# Patient Record
Sex: FEMALE | Race: OTHER | HISPANIC OR LATINO
[De-identification: names, ages, dates, MRNs, and addresses within clinical notes are randomized per-mention and may not be internally consistent; named-entity substitution may affect disease eponyms.]

---

## 2022-11-18 PROBLEM — Z00.00 ENCOUNTER FOR PREVENTIVE HEALTH EXAMINATION: Status: ACTIVE | Noted: 2022-11-18

## 2022-11-28 ENCOUNTER — APPOINTMENT (OUTPATIENT)
Dept: NEUROLOGY | Facility: CLINIC | Age: 57
End: 2022-11-28
Payer: COMMERCIAL

## 2022-11-28 VITALS
WEIGHT: 155 LBS | HEART RATE: 84 BPM | SYSTOLIC BLOOD PRESSURE: 126 MMHG | HEIGHT: 57 IN | BODY MASS INDEX: 33.44 KG/M2 | DIASTOLIC BLOOD PRESSURE: 76 MMHG

## 2022-11-28 DIAGNOSIS — Z78.9 OTHER SPECIFIED HEALTH STATUS: ICD-10-CM

## 2022-11-28 DIAGNOSIS — Z82.0 FAMILY HISTORY OF EPILEPSY AND OTHER DISEASES OF THE NERVOUS SYSTEM: ICD-10-CM

## 2022-11-28 DIAGNOSIS — Z86.39 PERSONAL HISTORY OF OTHER ENDOCRINE, NUTRITIONAL AND METABOLIC DISEASE: ICD-10-CM

## 2022-11-28 PROCEDURE — 99204 OFFICE O/P NEW MOD 45 MIN: CPT

## 2022-11-28 RX ORDER — ATORVASTATIN CALCIUM 80 MG/1
TABLET, FILM COATED ORAL
Refills: 0 | Status: ACTIVE | COMMUNITY

## 2022-11-28 RX ORDER — CHROMIUM 200 MCG
TABLET ORAL
Refills: 0 | Status: ACTIVE | COMMUNITY

## 2022-11-28 NOTE — ASSESSMENT
[FreeTextEntry1] : Impression: This 57-year-old Congolese-speaking female has a chronic history of headache for many years as described above most consistent with migraine and her presentation at this time for the last several months is consistent with chronic daily headache.  There is the daily use of Excedrin.  Today's neurological exam is normal.  There is a family history of migraine in her son and daughter.\par \par Recommendations: MRI of the brain with and without contrast.  Begin naproxen 500 mg twice daily for 5 days and then as needed.  Begin nortriptyline 10 mg 1 to 2 tablets at bedtime as directed.  Consider other medication trials for chronic daily headache and chronic migraine.  The patient recently had a physical exam and blood tests and those records will be requested.  Office follow-up.\par

## 2022-11-28 NOTE — HISTORY OF PRESENT ILLNESS
[FreeTextEntry1] : This patient is seen for an office consultation.  She is seen with her employer.  The patient speaks Panamanian and her employer translates.  The patient is a 57-year-old female who has chronic headache for years.  The headache is worsened during the last 3 months.  The headache can occur unilaterally and each temple shifting from side to side and is described as being severe and poorly characterized.  The patient will have nausea and vomiting and dizziness in association.  The headache is most severe in the morning after awakening and she will take Excedrin each morning with partial relief.  She was given a prescription medication for headache 4 months ago which she does not recall the name.  She denies visual aura.  She denies head trauma syncope seizure or CVA.  She denies any focal neurological complaints.\par \par Past history significant for hyperlipidemia and vitamin D deficiency treated with atorvastatin and vitamin D.\par \par Family history positive for headache and a son and daughter.\par \par The patient did emig[place] to [place] country from [place].  She speaks only Panamanian.  She is a non-smoker and does not use alcohol

## 2022-11-28 NOTE — PHYSICAL EXAM
[FreeTextEntry1] : This patient speaks Upper sorbian and the exam was performed with her friend and employer interpreting. \par \par  Head:  Normocephalic Neck: Supple nontender no carotid bruits.\par \par Mental Status:  Alert Oriented X3 Speech normal and no aphasia or dysarthria.  Speaks only Upper sorbian.   is present\par \par Cranial Nerves:  PERRL, Fundi normal Visual Fields full  EOMI no diplopia no ptosis no nystagmus, V through XII intact.\par \par Motor:  No drift, normal strength tone and coordination and no focal atrophy. No abnormal movements. No dysmetria.  Normal rapid alternating movements. \par \par DTRs: Symmetric and 2+.  Plantars flexor.  No Clonus.\par \par Sensory:  Normal testing with pin light touch and vibration and  Joint position sense.  Normal DSS to touch.\par \par Gait:  Normal including tandem walking heel toe walking and Rhomberg.\par

## 2022-11-29 RX ORDER — NAPROXEN 500 MG/1
500 TABLET ORAL
Qty: 60 | Refills: 1 | Status: ACTIVE | COMMUNITY
Start: 2022-11-28 | End: 1900-01-01

## 2022-11-29 RX ORDER — NORTRIPTYLINE HYDROCHLORIDE 10 MG/1
10 CAPSULE ORAL
Qty: 60 | Refills: 5 | Status: ACTIVE | COMMUNITY
Start: 2022-11-28 | End: 1900-01-01

## 2023-01-03 ENCOUNTER — APPOINTMENT (OUTPATIENT)
Dept: MRI IMAGING | Facility: HOSPITAL | Age: 58
End: 2023-01-03
Payer: MEDICAID

## 2023-01-03 ENCOUNTER — OUTPATIENT (OUTPATIENT)
Dept: OUTPATIENT SERVICES | Facility: HOSPITAL | Age: 58
LOS: 1 days | End: 2023-01-03
Payer: COMMERCIAL

## 2023-01-03 DIAGNOSIS — R51.9 HEADACHE, UNSPECIFIED: ICD-10-CM

## 2023-01-03 PROCEDURE — 70553 MRI BRAIN STEM W/O & W/DYE: CPT

## 2023-01-03 PROCEDURE — A9579: CPT

## 2023-01-03 PROCEDURE — 70553 MRI BRAIN STEM W/O & W/DYE: CPT | Mod: 26

## 2023-01-23 ENCOUNTER — APPOINTMENT (OUTPATIENT)
Dept: NEUROLOGY | Facility: CLINIC | Age: 58
End: 2023-01-23
Payer: MEDICAID

## 2023-01-23 VITALS
DIASTOLIC BLOOD PRESSURE: 76 MMHG | HEIGHT: 57 IN | SYSTOLIC BLOOD PRESSURE: 132 MMHG | HEART RATE: 86 BPM | WEIGHT: 155 LBS | BODY MASS INDEX: 33.44 KG/M2

## 2023-01-23 VITALS
SYSTOLIC BLOOD PRESSURE: 132 MMHG | BODY MASS INDEX: 33.44 KG/M2 | DIASTOLIC BLOOD PRESSURE: 76 MMHG | HEART RATE: 86 BPM | HEIGHT: 57 IN | WEIGHT: 155 LBS

## 2023-01-23 DIAGNOSIS — R51.9 HEADACHE, UNSPECIFIED: ICD-10-CM

## 2023-01-23 PROCEDURE — 99214 OFFICE O/P EST MOD 30 MIN: CPT

## 2023-01-23 NOTE — PHYSICAL EXAM
[FreeTextEntry1] : Head:  Normocephalic Neck: Supple nontender no carotid bruits.  Spine:  Nontender negative straight leg raising.\par \par Mental Status:  Alert Oriented X3 Speech normal and no aphasia or dysarthria.\par \par Cranial Nerves:  PERRL, Fundi normal Visual Fields full  EOMI no diplopia no ptosis no nystagmus, V through XII intact.\par \par Motor:  No drift, normal strength tone and coordination and no focal atrophy. No abnormal movements. No dysmetria.  Normal rapid alternating movements. \par \par DTRs: Symmetric and 2+.  Plantars flexor.  No Clonus.\par \par Sensory: Unremarkable.\par \par Gait:  Normal including tandem walking heel toe walking and Rhomberg.\par

## 2023-01-23 NOTE — HISTORY OF PRESENT ILLNESS
[FreeTextEntry1] : This patient is seen for an office visit with her employer.  The patient speaks only Armenian and her employer interprets.  There is of been a marked decrease in headache frequency and severity while taking nortriptyline 10 mg 1 tablet at bedtime.  She is no longer taking naproxen 500 mg.  She has improved sleep.  After some mild binocular blurring of vision on awakening.  The headache had been unilateral shifting from side to side affecting the temple.\par \par MRI of the brain performed on 10/3/2023 was unremarkable except for some nonspecific foci of white matter hyperintensity and there was no contrast-enhancement.\par \par

## 2023-01-23 NOTE — ASSESSMENT
[FreeTextEntry1] : Impression: This 57-year-old Moldovan-speaking female patient presents with a chronic headache disorder worsened within the last several months consistent with chronic daily headache.  There is marked improvement with nortriptyline 10 mg at bedtime after a short course of naproxen.  She is not having significant side effects from the medication.  Brain MRI other than some nonspecific foci of white matter hyperintensity which could be consistent with migraine was unremarkable.  Today's neurological exam is normal.\par \par Recommendations: Continue nortriptyline 10 mg 1 tablet at bedtime.  Office follow-up as directed.  Discussed with her employer who translates for the patient.\par

## 2023-06-05 ENCOUNTER — APPOINTMENT (OUTPATIENT)
Dept: NEUROLOGY | Facility: CLINIC | Age: 58
End: 2023-06-05

## 2023-07-11 ENCOUNTER — APPOINTMENT (OUTPATIENT)
Dept: SURGERY | Facility: CLINIC | Age: 58
End: 2023-07-11
Payer: MEDICAID

## 2023-07-11 PROCEDURE — 99204 OFFICE O/P NEW MOD 45 MIN: CPT

## 2023-08-21 ENCOUNTER — APPOINTMENT (OUTPATIENT)
Age: 58
End: 2023-08-21
Payer: MEDICAID

## 2023-08-21 VITALS
HEART RATE: 89 BPM | HEIGHT: 57 IN | WEIGHT: 158 LBS | DIASTOLIC BLOOD PRESSURE: 82 MMHG | BODY MASS INDEX: 34.09 KG/M2 | SYSTOLIC BLOOD PRESSURE: 144 MMHG

## 2023-08-21 PROCEDURE — 99213 OFFICE O/P EST LOW 20 MIN: CPT

## 2023-08-21 NOTE — PHYSICAL EXAM
[Alert] : alert [Oriented to Person] : oriented to person [Oriented to Place] : oriented to place [Oriented to Time] : oriented to time [Calm] : calm [de-identified] : She  is alert, well-groomed, and in NAD   [de-identified] : anicteric.  Nasal mucosa pink, septum midline. Oral mucosa pink.  Tongue midline, Pharynx without exudates.   [de-identified] : Neck supple. Trachea midline. Thyroid isthmus barely palpable, lobes not felt.   [de-identified] :   Breast are symmetric,  No palpable nodules, masses, tenderness, or axillary or supraclavicular adenopathy. No nipple discharge. no skin retraction

## 2023-08-21 NOTE — DATA REVIEWED
[FreeTextEntry1] : 	 Exam requested by: JAVIER JACOBO  [place], [place] [address] [facility] [address] [facility] PHONE: [phone] Patient: GISELLE MCMAHAN YOB: 1965 Phone: [phone] MRN: [id number] Acc: [id number] Date of Exam: 08-   EXAM: MRI BILATERAL BREASTS WITHOUT AND WITH CONTRAST  HISTORY: The patient is 58 years old recently ultrasound-guided core biopsy of a right 4:00 4 cm from the nipple axis mass, which yielded moderately differentiated invasive ductal carcinoma: Estrogen Positive, Progesterone Positive, HER-2 negative. Patient presents for extent of disease MRI. There is no family history of breast cancer.  TECHNIQUE: Breast MR protocol - Multiplanar, multisequence MR imaging of both breasts was performed on a 3T magnet: T2 weighted sequence with fat suppression in the axial plane, T1 weighted sequences with fat suppression before and after the administration of gadolinium contrast in the axial plane, and sagittal reformats.   IV Contrast:  16 ml of Clariscan was injected from a 20 ml single use vial.  The patient was scanned in the prone position utilizing a dedicated breast coil.    Post processing subtraction was performed. This study was analyzed on a [employer] Workstation with capabilities of multiplanar reformatting, maximum intensity projection, computer aided detection and time:signal intensity kinetic curve generation.  Results were compared with the PACS workstation.   COMPARISON: The present examination has been compared to prior breast imaging dated back to 6/10/2023.  FINDINGS:    BACKGROUND BREAST APPEARANCE: There is scattered fibroglandular tissue with mild background parenchymal enhancement.  Right Breast *   Within the lower inner right breast 5-cmfn, there is susceptibility artifact associated with the biopsy clip marking the site of known cancer (Image 73/Series 507). There is an irregular mass associated with a biopsy clip that is T2 isointense and demonstrates subthreshold kinetics. The mass measures up to 1.5-cm in anterior-posterior dimension.  *   Within the upper inner right breast 5 cm from the nipple, there is clumped non-mass enhancement that is T2 isointense and demonstrates subthreshold kinetics (Image 94/Series 502). This non-mass enhancement is located approximately 5 cm lateral to the index cancer. MR guided biopsy is recommended for a satellite lesion.  Left Breast There is no suspicious enhancement in the left breast.   Lymph Nodes *   There is no axillary or internal mammary lymphadenopathy.   Extramammary Findings There are no significant extramammary findings.  IMPRESSION:   1.  Known right 4:00 axis breast cancer measuring up to 1.5-cm in greatest/anterior-posterior dimension.  2.  MR-guided biopsy of the right breast is recommended for suspicious non-mass enhancement in the upper inner region 5-cmfn to exclude a satellite lesion (annotated).   FOLLOW-UP: Biopsy should be considered. MR-guided biopsy of the right breast, 1 site.  ASSESSMENT: BI-RADS Category 4:  Suspicious.   Thank you for the opportunity to participate in the care of this patient.     Estefany Cormier MD  - Electronically Signed: 08- 3:11 PM  Physician to Physician Direct Line is: [phone]

## 2023-08-21 NOTE — PLAN
[FreeTextEntry1] : Ms. PILY STERN  was told significance of findings, options, risks and benefits were explained.  Results of the MRI were reviewed with the patient and her daughter   All surgical options were discussed including non-surgical treatments.   she was advised to have MRI guided biopsy of the right breast and follow up after the test.  Patient advised to seek immediate medical attention with any acute change in symptoms or with the development of any new or worsening symptoms.  Patient's questions and concerns addressed to patient's satisfaction, and patient verbalized an understanding of the information discussed.

## 2023-08-21 NOTE — ASSESSMENT
[FreeTextEntry1] : Impression: right breast CA - MRI shows additional area of concern. MRI guided Biopsy recommended.

## 2023-08-21 NOTE — HISTORY OF PRESENT ILLNESS
[de-identified] : This is  a 58 year   old patient presenting today for a breast exam and to discuss the results of her recent  breast imaging. Patient had a BL breast  MRI on 08/10/2023 .  Deemed BIRADS category 4  and showed  Known right 4:00 axis breast cancer measuring up to 1.5-cm in greatest/anterior-posterior dimension and suspicious . non-mass enhancement in the upper inner region 5-cmfn. Ms. PILY STERN denies any trauma to the breast, denies  skin changes  and   she has no spontaneous nipple discharge. Patient denies any fever, night sweats or loss of appetite.  PERTINENT HISTORY:   There is  family history of breast carcinoma in paternal cousin.   Menarche at age 14 , -3 , para-3 , and her last menstrual period was at age 41. .  Patient is on no hormonal replacement therapy.    Patient  had a B   mammogram on 2023 ,   that was deemed a BIRADS 4.    Patient  had a targeted Right breast US and a  mammogram on 2023   that was deemed a BIRADS 5.    A sono-guided biopsy of the right breast was done on 2023 .  Pathology results were consistent with Invasive moderately differentiated ductal carcinoma, [place] score 6/9. Invasive tumor measures at least 1.2 cm. Microcalcifications are present. Lymphovascular permeation by tumor is not seen. The pathology results are concordant with the imaging.  ER/VA positive. HER2 negative.    [de-identified] : Patient reports no interval changes to her overall health status or medical history

## 2023-09-07 ENCOUNTER — APPOINTMENT (OUTPATIENT)
Age: 58
End: 2023-09-07
Payer: MEDICAID

## 2023-09-07 VITALS
HEIGHT: 57 IN | SYSTOLIC BLOOD PRESSURE: 133 MMHG | DIASTOLIC BLOOD PRESSURE: 91 MMHG | WEIGHT: 157 LBS | HEART RATE: 85 BPM | BODY MASS INDEX: 33.87 KG/M2 | OXYGEN SATURATION: 98 %

## 2023-09-07 PROCEDURE — 99213 OFFICE O/P EST LOW 20 MIN: CPT

## 2023-09-07 NOTE — PLAN
[FreeTextEntry1] : Ms. PILY STERN  was told significance of findings, options, risks and benefits were explained.   Patient has a multifocal right breast CA. she wants to have a mastectomy with reconstruction.  Informed consent for modified radical right breast mastectomy with sentinel lymph node biopsy , and potential risks, benefits and alternatives (surgical options were discussed including non-surgical options or the option of no surgery) to the planned surgery were discussed in depth.  All surgical options were discussed including non-surgical treatments.  She wishes to proceed with surgery.  We will plan for surgery on at the next available date, pending any required insurance pre-certification or pre-approval. She agrees to obtain any necessary pre-operative evaluations and testing prior to surgery. Patient advised to seek immediate medical attention with any acute change in symptoms or with the development of any new or worsening symptoms.  Patient's questions and concerns addressed to patient's satisfaction, and patient verbalized an understanding of the information discussed.

## 2023-09-07 NOTE — HISTORY OF PRESENT ILLNESS
[de-identified] : This is  a 58 year   old patient presenting today for a breast exam and to discuss the results of her  breast Biopsy.   Patient had a BL breast MRI on 08/10/2023. Deemed BIRADS category 4 and showed Known right 4:00 axis breast cancer measuring up to 1.5-cm in greatest/anterior-posterior dimension and suspicious. non-mass enhancement in the upper inner region 5-cmfn.  Ms. PILY STERN  is s/p MR guided right breast biopsy  on 2023. Patient's pathology results were  consistent with Ductal carcinoma in situ, low-grade, cribriform type. Ms. PILY STERN reports pain and swelling in her right breast after the biopsy.  Patient denies any fever, night sweats or loss of appetite.     PERTINENT HISTORY: There is family history of breast carcinoma in paternal cousin. Menarche at age 14 , -3 , para-3 , and her last menstrual period was at age 41.. Patient is on no hormonal replacement therapy.  Patient had a BL mammogram on 2023 , that was deemed a BIRADS 4.  Patient had a targeted Right breast US and a mammogram on 2023 that was deemed a BIRADS 5. A sono-guided biopsy of the right breast was done on 2023. Pathology results were consistent with Invasive moderately differentiated ductal carcinoma, [place] score 6/9. Invasive tumor measures at least 1.2 cm. Microcalcifications are present. Lymphovascular permeation by tumor is not seen. The pathology results are concordant with the imaging. ER/NV positive. HER2 negative. [de-identified] : Patient reports no interval changes to her overall health status or medical history

## 2023-09-07 NOTE — PHYSICAL EXAM
[Alert] : alert [Oriented to Person] : oriented to person [Oriented to Place] : oriented to place [Oriented to Time] : oriented to time [Calm] : calm [de-identified] : She  is alert, well-groomed, and in NAD   [de-identified] : anicteric.  Nasal mucosa pink, septum midline. Oral mucosa pink.  Tongue midline, Pharynx without exudates.   [de-identified] : Neck supple. Trachea midline. Thyroid isthmus barely palpable, lobes not felt.   [de-identified] : right breast resolving hematoma.   Breast are symmetric,  No palpable nodules, masses, tenderness, or axillary or supraclavicular adenopathy. No nipple discharge. no skin retraction

## 2023-09-07 NOTE — DATA REVIEWED
[FreeTextEntry1] : 	 Exam requested by: REHAN WALKER MD [address] [facility], [address] [place] [id number] SITE PERFORMED: [facility] SITE PHONE: [phone] Patient: GISELLE MCMAHAN YOB: 1965 Phone: [phone] MRN: [id number] Acc: [id number] Date of Exam: 08-   Addendum 1 - 08-   BREAST SURGICAL PATHOLOGY ADDENDUM:  Right breast upper outer: Ductal carcinoma in situ, low-grade, cribriform type. ER and NE pending.  Pathology results indicate that the specimen is malignant.  The pathology results are concordant with the imaging.   Recommend surgical consultation. Recommend appropriate action for the patient's known invasive ductal carcinoma of the right breast.  A message was left for Dr. Rehan Stafford at 1:45 PM on 8/30/2023.  Critical results pathway was utilized.  Thank you for the opportunity to participate in the care of this patient.     Jim Michelle MD  - Electronically Signed: 08- 1:47 PM  Physician to Physician Direct Line is: [phone] Original Report EXAM: MR GUIDED RIGHT BREAST BIOPSY 1 SITE  HISTORY: The patient is 58 years old and was referred for an MRI biopsy of non-mass enhancement located in the upper outer quadrant of right breast. Please note the upper outer quadrant was annotated on prior study dated 8/10/2023. No lesion was annotated in the upper inner quadrant.  COMPARISON: Prior breast imaging studies dated 2023  PROCEDURE: The procedure was explained to the patient, including benefits and alternatives. The risks, including but not limited to infection and bleeding, were reviewed. All questions were answered, and the patient agreed to the procedure, signing the consent form. Universal timeout was performed.  A right breast MR biopsy protocol with pre- and post-sequential dynamic T1 weighted localization images was performed. 16 mL of gadoterate meglumine from a 20 mL vial was administered.  The non-mass enhancement at the right upper outer quadrant is again identified and located within the operating window.   Using sterile technique, 5 mL of 2% lidocaine and 5 mL of 2% lidocaine with epinephrine was administered. Initially an introducer and subsequently a vacuum-assisted biopsy device was placed under MR-guidance. Positioning was confirmed with standard needle utilizing a lateral approach. The usual number of core samples was obtained.  A cork shaped biopsy clip was then deployed at the biopsy site.   A postprocedure mammogram demonstrates appropriate placement of the clip.    The patient tolerated the procedure without complications. The patient was given postbiopsy care instructions. The specimen was subsequently sent to the pathology lab.  IMPRESSION: 1 site MR biopsy was performed.   Pathology results and concordance will follow as an addendum to this report.   Thank you for the opportunity to participate in the care of this patient.     Jim Michelle MD  - Electronically Signed: 08- 12:54 PM  Physician to Physician Direct Line is: [phone]

## 2023-10-03 ENCOUNTER — OUTPATIENT (OUTPATIENT)
Dept: OUTPATIENT SERVICES | Facility: HOSPITAL | Age: 58
LOS: 1 days | End: 2023-10-03
Payer: MEDICAID

## 2023-10-03 VITALS
DIASTOLIC BLOOD PRESSURE: 92 MMHG | WEIGHT: 149.91 LBS | SYSTOLIC BLOOD PRESSURE: 131 MMHG | HEIGHT: 58 IN | OXYGEN SATURATION: 96 % | HEART RATE: 98 BPM | TEMPERATURE: 99 F | RESPIRATION RATE: 16 BRPM

## 2023-10-03 DIAGNOSIS — Z98.890 OTHER SPECIFIED POSTPROCEDURAL STATES: Chronic | ICD-10-CM

## 2023-10-03 DIAGNOSIS — C50.911 MALIGNANT NEOPLASM OF UNSPECIFIED SITE OF RIGHT FEMALE BREAST: ICD-10-CM

## 2023-10-03 DIAGNOSIS — Z01.818 ENCOUNTER FOR OTHER PREPROCEDURAL EXAMINATION: ICD-10-CM

## 2023-10-03 DIAGNOSIS — Z98.891 HISTORY OF UTERINE SCAR FROM PREVIOUS SURGERY: Chronic | ICD-10-CM

## 2023-10-03 DIAGNOSIS — E78.5 HYPERLIPIDEMIA, UNSPECIFIED: ICD-10-CM

## 2023-10-03 DIAGNOSIS — Z29.9 ENCOUNTER FOR PROPHYLACTIC MEASURES, UNSPECIFIED: ICD-10-CM

## 2023-10-03 LAB — BLD GP AB SCN SERPL QL: SIGNIFICANT CHANGE UP

## 2023-10-03 RX ORDER — SODIUM CHLORIDE 9 MG/ML
3 INJECTION INTRAMUSCULAR; INTRAVENOUS; SUBCUTANEOUS EVERY 8 HOURS
Refills: 0 | Status: DISCONTINUED | OUTPATIENT
Start: 2023-10-11 | End: 2023-10-11

## 2023-10-03 NOTE — H&P PST ADULT - HISTORY OF PRESENT ILLNESS
57 yo female with history of HLD, Prediabetes, reports the above.  Patient states this the first time her mammogram show cancer in the right breast.  She is scheduled for : Right Breast Modified Radical Mastectomy with [place] Lymph Node Biopsy, on 10/11/23

## 2023-10-03 NOTE — H&P PST ADULT - ASSESSMENT
59 yo female is scheduled for : Right Breast Modified Radical Mastectomy with [place] Lymph Node Biopsy, on 10/11/23

## 2023-10-03 NOTE — H&P PST ADULT - NSANTHBPHIGHRD_ENT_A_CORE
Let patient know that her insurance was declined.  Looks like patient can only have a regular scooter   No

## 2023-10-03 NOTE — H&P PST ADULT - NSICDXFAMILYHX_GEN_ALL_CORE_FT
FAMILY HISTORY:  Father  Still living? No  FH: heart attack, Age at diagnosis: Age Unknown    Mother  Still living? No  FH: type 2 diabetes, Age at diagnosis: Age Unknown    Sibling  Still living? Yes, Estimated age: Age Unknown  FH: prostate cancer, Age at diagnosis: Age Unknown

## 2023-10-03 NOTE — H&P PST ADULT - NSANTHOSAYNRD_GEN_A_CORE
No. FEDE screening performed.  STOP BANG Legend: 0-2 = LOW Risk; 3-4 = INTERMEDIATE Risk; 5-8 = HIGH Risk

## 2023-10-03 NOTE — H&P PST ADULT - NEGATIVE ALLERGY TYPES
no outdoor environmental allergies/no reactions to medicines/no reactions to food/no reactions to animals/no reactions to insect bites

## 2023-10-03 NOTE — H&P PST ADULT - PROBLEM SELECTOR PLAN 1
Right Breast Modified Radical Mastectomy with [place] Lymph Node Biopsy      STOP BANG : 1  Low risk for FEDE complications

## 2023-10-03 NOTE — H&P PST ADULT - NSICDXPROCEDURE_GEN_ALL_CORE_FT
PROCEDURES:  Localization of sentinel lymph node 03-Oct-2023 15:39:30  Desiree Mark  Modified mastectomy 03-Oct-2023 15:40:08  Desiree Mark

## 2023-10-09 PROCEDURE — G0463: CPT

## 2023-10-10 ENCOUNTER — TRANSCRIPTION ENCOUNTER (OUTPATIENT)
Age: 58
End: 2023-10-10

## 2023-10-11 ENCOUNTER — RESULT REVIEW (OUTPATIENT)
Age: 58
End: 2023-10-11

## 2023-10-11 ENCOUNTER — APPOINTMENT (OUTPATIENT)
Dept: SURGERY | Facility: HOSPITAL | Age: 58
End: 2023-10-11

## 2023-10-11 ENCOUNTER — INPATIENT (INPATIENT)
Facility: HOSPITAL | Age: 58
LOS: 0 days | Discharge: HOME | DRG: 581 | End: 2023-10-12
Attending: SPECIALIST | Admitting: SPECIALIST
Payer: MEDICAID

## 2023-10-11 VITALS
RESPIRATION RATE: 16 BRPM | OXYGEN SATURATION: 98 % | HEART RATE: 88 BPM | TEMPERATURE: 99 F | DIASTOLIC BLOOD PRESSURE: 86 MMHG | HEIGHT: 58 IN | WEIGHT: 149.91 LBS | SYSTOLIC BLOOD PRESSURE: 119 MMHG

## 2023-10-11 DIAGNOSIS — Z98.890 OTHER SPECIFIED POSTPROCEDURAL STATES: Chronic | ICD-10-CM

## 2023-10-11 DIAGNOSIS — Z98.891 HISTORY OF UTERINE SCAR FROM PREVIOUS SURGERY: Chronic | ICD-10-CM

## 2023-10-11 DIAGNOSIS — C50.911 MALIGNANT NEOPLASM OF UNSPECIFIED SITE OF RIGHT FEMALE BREAST: ICD-10-CM

## 2023-10-11 LAB — BLD GP AB SCN SERPL QL: SIGNIFICANT CHANGE UP

## 2023-10-11 PROCEDURE — 88333 PATH CONSLTJ SURG CYTO XM 1: CPT | Mod: 26

## 2023-10-11 PROCEDURE — 88309 TISSUE EXAM BY PATHOLOGIST: CPT | Mod: 26

## 2023-10-11 PROCEDURE — 88341 IMHCHEM/IMCYTCHM EA ADD ANTB: CPT | Mod: 26

## 2023-10-11 PROCEDURE — 19307 MAST MOD RAD: CPT | Mod: RT

## 2023-10-11 PROCEDURE — 88342 IMHCHEM/IMCYTCHM 1ST ANTB: CPT | Mod: 26

## 2023-10-11 PROCEDURE — 88307 TISSUE EXAM BY PATHOLOGIST: CPT | Mod: 26

## 2023-10-11 PROCEDURE — 19307 MAST MOD RAD: CPT | Mod: AS,RT

## 2023-10-11 RX ORDER — HYDROMORPHONE HYDROCHLORIDE 2 MG/ML
1 INJECTION INTRAMUSCULAR; INTRAVENOUS; SUBCUTANEOUS
Refills: 0 | Status: DISCONTINUED | OUTPATIENT
Start: 2023-10-11 | End: 2023-10-11

## 2023-10-11 RX ORDER — HYDROMORPHONE HYDROCHLORIDE 2 MG/ML
0.5 INJECTION INTRAMUSCULAR; INTRAVENOUS; SUBCUTANEOUS
Refills: 0 | Status: DISCONTINUED | OUTPATIENT
Start: 2023-10-11 | End: 2023-10-12

## 2023-10-11 RX ORDER — ENOXAPARIN SODIUM 100 MG/ML
40 INJECTION SUBCUTANEOUS EVERY 24 HOURS
Refills: 0 | Status: DISCONTINUED | OUTPATIENT
Start: 2023-10-12 | End: 2023-10-12

## 2023-10-11 RX ORDER — ATORVASTATIN CALCIUM 80 MG/1
1 TABLET, FILM COATED ORAL
Refills: 0 | DISCHARGE

## 2023-10-11 RX ORDER — OMEGA-3 ACID ETHYL ESTERS 1 G
1 CAPSULE ORAL
Refills: 0 | DISCHARGE

## 2023-10-11 RX ORDER — HYDROMORPHONE HYDROCHLORIDE 2 MG/ML
2 INJECTION INTRAMUSCULAR; INTRAVENOUS; SUBCUTANEOUS
Refills: 0 | Status: DISCONTINUED | OUTPATIENT
Start: 2023-10-11 | End: 2023-10-11

## 2023-10-11 RX ORDER — ACETAMINOPHEN 500 MG
1000 TABLET ORAL ONCE
Refills: 0 | Status: COMPLETED | OUTPATIENT
Start: 2023-10-11 | End: 2023-10-12

## 2023-10-11 RX ORDER — KETOROLAC TROMETHAMINE 30 MG/ML
15 SYRINGE (ML) INJECTION EVERY 6 HOURS
Refills: 0 | Status: DISCONTINUED | OUTPATIENT
Start: 2023-10-11 | End: 2023-10-12

## 2023-10-11 RX ORDER — ONDANSETRON 8 MG/1
4 TABLET, FILM COATED ORAL ONCE
Refills: 0 | Status: DISCONTINUED | OUTPATIENT
Start: 2023-10-11 | End: 2023-10-11

## 2023-10-11 RX ORDER — SODIUM CHLORIDE 9 MG/ML
1000 INJECTION, SOLUTION INTRAVENOUS
Refills: 0 | Status: DISCONTINUED | OUTPATIENT
Start: 2023-10-11 | End: 2023-10-11

## 2023-10-11 RX ADMIN — HYDROMORPHONE HYDROCHLORIDE 0.5 MILLIGRAM(S): 2 INJECTION INTRAMUSCULAR; INTRAVENOUS; SUBCUTANEOUS at 18:42

## 2023-10-11 RX ADMIN — SODIUM CHLORIDE 75 MILLILITER(S): 9 INJECTION, SOLUTION INTRAVENOUS at 17:13

## 2023-10-11 NOTE — PATIENT PROFILE ADULT - PUBLIC BENEFITS
-sodium 132  -likely combination of SIADH and poor p o   Intake  -will continue with IV fluids, monitor sodium no

## 2023-10-11 NOTE — CHART NOTE - NSCHARTNOTEFT_GEN_A_CORE
Pt POD 0 s/p Right modified radical mastectomy w/ sentinel lymph node biopsy.  JPs ss- 50, 50cc    no n/v  comfortable  voided    Vital Signs Last 24 Hrs  T(C): 37 (11 Oct 2023 20:08), Max: 37.1 (11 Oct 2023 08:55)  T(F): 98.6 (11 Oct 2023 20:08), Max: 98.8 (11 Oct 2023 08:55)  HR: 98 (11 Oct 2023 20:08) (82 - 108)  BP: 103/69 (11 Oct 2023 20:08) (103/69 - 121/71)  BP(mean): 80 (11 Oct 2023 20:08) (80 - 88)  RR: 16 (11 Oct 2023 20:08) (16 - 28)  SpO2: 96% (11 Oct 2023 20:08) (96% - 100%)    Parameters below as of 11 Oct 2023 20:08  Patient On (Oxygen Delivery Method): room air    surgical bra CDI    stable post-op

## 2023-10-11 NOTE — ASU PREOP CHECKLIST - TAMPON REMOVED
Erythromycin Counseling:  I discussed with the patient the risks of erythromycin including but not limited to GI upset, allergic reaction, drug rash, diarrhea, increase in liver enzymes, and yeast infections. Use Enhanced Medication Counseling?: No Topical Clindamycin Counseling: Patient counseled that this medication may cause skin irritation or allergic reactions.  In the event of skin irritation, the patient was advised to reduce the amount of the drug applied or use it less frequently.   The patient verbalized understanding of the proper use and possible adverse effects of clindamycin.  All of the patient's questions and concerns were addressed. Bactrim Pregnancy And Lactation Text: This medication is Pregnancy Category D and is known to cause fetal risk.  It is also excreted in breast milk. Minocycline Counseling: Patient advised regarding possible photosensitivity and discoloration of the teeth, skin, lips, tongue and gums.  Patient instructed to avoid sunlight, if possible.  When exposed to sunlight, patients should wear protective clothing, sunglasses, and sunscreen.  The patient was instructed to call the office immediately if the following severe adverse effects occur:  hearing changes, easy bruising/bleeding, severe headache, or vision changes.  The patient verbalized understanding of the proper use and possible adverse effects of minocycline.  All of the patient's questions and concerns were addressed. Bactrim Counseling:  I discussed with the patient the risks of sulfa antibiotics including but not limited to GI upset, allergic reaction, drug rash, diarrhea, dizziness, photosensitivity, and yeast infections.  Rarely, more serious reactions can occur including but not limited to aplastic anemia, agranulocytosis, methemoglobinemia, blood dyscrasias, liver or kidney failure, lung infiltrates or desquamative/blistering drug rashes. Isotretinoin Counseling: Patient should get monthly blood tests, not donate blood, not drive at night if vision affected, not share medication, and not undergo elective surgery for 6 months after tx completed. Side effects reviewed, pt to contact office should one occur. Tazorac Pregnancy And Lactation Text: This medication is not safe during pregnancy. It is unknown if this medication is excreted in breast milk. Azithromycin Counseling:  I discussed with the patient the risks of azithromycin including but not limited to GI upset, allergic reaction, drug rash, diarrhea, and yeast infections. Azithromycin Pregnancy And Lactation Text: This medication is considered safe during pregnancy and is also secreted in breast milk. Detail Level: Zone Spironolactone Pregnancy And Lactation Text: This medication can cause feminization of the male fetus and should be avoided during pregnancy. The active metabolite is also found in breast milk. Dapsone Pregnancy And Lactation Text: This medication is Pregnancy Category C and is not considered safe during pregnancy or breast feeding. High Dose Vitamin A Pregnancy And Lactation Text: High dose vitamin A therapy is contraindicated during pregnancy and breast feeding. Tetracycline Pregnancy And Lactation Text: This medication is Pregnancy Category D and not consider safe during pregnancy. It is also excreted in breast milk. Doxycycline Counseling:  Patient counseled regarding possible photosensitivity and increased risk for sunburn.  Patient instructed to avoid sunlight, if possible.  When exposed to sunlight, patients should wear protective clothing, sunglasses, and sunscreen.  The patient was instructed to call the office immediately if the following severe adverse effects occur:  hearing changes, easy bruising/bleeding, severe headache, or vision changes.  The patient verbalized understanding of the proper use and possible adverse effects of doxycycline.  All of the patient's questions and concerns were addressed. Tetracycline Counseling: Patient counseled regarding possible photosensitivity and increased risk for sunburn.  Patient instructed to avoid sunlight, if possible.  When exposed to sunlight, patients should wear protective clothing, sunglasses, and sunscreen.  The patient was instructed to call the office immediately if the following severe adverse effects occur:  hearing changes, easy bruising/bleeding, severe headache, or vision changes.  The patient verbalized understanding of the proper use and possible adverse effects of tetracycline.  All of the patient's questions and concerns were addressed. Patient understands to avoid pregnancy while on therapy due to potential birth defects. Doxycycline Pregnancy And Lactation Text: This medication is Pregnancy Category D and not consider safe during pregnancy. It is also excreted in breast milk but is considered safe for shorter treatment courses. Birth Control Pills Counseling: Birth Control Pill Counseling: I discussed with the patient the potential side effects of OCPs including but not limited to increased risk of stroke, heart attack, thrombophlebitis, deep venous thrombosis, hepatic adenomas, breast changes, GI upset, headaches, and depression.  The patient verbalized understanding of the proper use and possible adverse effects of OCPs. All of the patient's questions and concerns were addressed. Topical Retinoid Pregnancy And Lactation Text: This medication is Pregnancy Category C. It is unknown if this medication is excreted in breast milk. Erythromycin Pregnancy And Lactation Text: This medication is Pregnancy Category B and is considered safe during pregnancy. It is also excreted in breast milk. Detail Level: Detailed Benzoyl Peroxide Pregnancy And Lactation Text: This medication is Pregnancy Category C. It is unknown if benzoyl peroxide is excreted in breast milk. n/a High Dose Vitamin A Counseling: Side effects reviewed, pt to contact office should one occur. Birth Control Pills Pregnancy And Lactation Text: This medication should be avoided if pregnant and for the first 30 days post-partum. Spironolactone Counseling: Patient advised regarding risks of diarrhea, abdominal pain, hyperkalemia, birth defects (for female patients), liver toxicity and renal toxicity. The patient may need blood work to monitor liver and kidney function and potassium levels while on therapy. The patient verbalized understanding of the proper use and possible adverse effects of spironolactone.  All of the patient's questions and concerns were addressed. Topical Retinoid counseling:  Patient advised to apply a pea-sized amount only at bedtime and wait 30 minutes after washing their face before applying.  If too drying, patient may add a non-comedogenic moisturizer. The patient verbalized understanding of the proper use and possible adverse effects of retinoids.  All of the patient's questions and concerns were addressed. Dapsone Counseling: I discussed with the patient the risks of dapsone including but not limited to hemolytic anemia, agranulocytosis, rashes, methemoglobinemia, kidney failure, peripheral neuropathy, headaches, GI upset, and liver toxicity.  Patients who start dapsone require monitoring including baseline LFTs and weekly CBCs for the first month, then every month thereafter.  The patient verbalized understanding of the proper use and possible adverse effects of dapsone.  All of the patient's questions and concerns were addressed. Topical Clindamycin Pregnancy And Lactation Text: This medication is Pregnancy Category B and is considered safe during pregnancy. It is unknown if it is excreted in breast milk. Topical Sulfur Applications Pregnancy And Lactation Text: This medication is Pregnancy Category C and has an unknown safety profile during pregnancy. It is unknown if this topical medication is excreted in breast milk. Benzoyl Peroxide Counseling: Patient counseled that medicine may cause skin irritation and bleach clothing.  In the event of skin irritation, the patient was advised to reduce the amount of the drug applied or use it less frequently.   The patient verbalized understanding of the proper use and possible adverse effects of benzoyl peroxide.  All of the patient's questions and concerns were addressed. Isotretinoin Pregnancy And Lactation Text: This medication is Pregnancy Category X and is considered extremely dangerous during pregnancy. It is unknown if it is excreted in breast milk. Topical Sulfur Applications Counseling: Topical Sulfur Counseling: Patient counseled that this medication may cause skin irritation or allergic reactions.  In the event of skin irritation, the patient was advised to reduce the amount of the drug applied or use it less frequently.   The patient verbalized understanding of the proper use and possible adverse effects of topical sulfur application.  All of the patient's questions and concerns were addressed. Tazorac Counseling:  Patient advised that medication is irritating and drying.  Patient may need to apply sparingly and wash off after an hour before eventually leaving it on overnight.  The patient verbalized understanding of the proper use and possible adverse effects of tazorac.  All of the patient's questions and concerns were addressed.

## 2023-10-12 ENCOUNTER — TRANSCRIPTION ENCOUNTER (OUTPATIENT)
Age: 58
End: 2023-10-12

## 2023-10-12 VITALS
SYSTOLIC BLOOD PRESSURE: 105 MMHG | DIASTOLIC BLOOD PRESSURE: 74 MMHG | RESPIRATION RATE: 18 BRPM | TEMPERATURE: 98 F | OXYGEN SATURATION: 97 % | HEART RATE: 76 BPM

## 2023-10-12 PROBLEM — R73.03 PREDIABETES: Chronic | Status: ACTIVE | Noted: 2023-10-03

## 2023-10-12 LAB
ANION GAP SERPL CALC-SCNC: 8 MMOL/L — SIGNIFICANT CHANGE UP (ref 5–17)
BUN SERPL-MCNC: 13 MG/DL — SIGNIFICANT CHANGE UP (ref 7–18)
CALCIUM SERPL-MCNC: 9 MG/DL — SIGNIFICANT CHANGE UP (ref 8.4–10.5)
CHLORIDE SERPL-SCNC: 109 MMOL/L — HIGH (ref 96–108)
CO2 SERPL-SCNC: 23 MMOL/L — SIGNIFICANT CHANGE UP (ref 22–31)
CREAT SERPL-MCNC: 0.7 MG/DL — SIGNIFICANT CHANGE UP (ref 0.5–1.3)
EGFR: 101 ML/MIN/1.73M2 — SIGNIFICANT CHANGE UP
GLUCOSE SERPL-MCNC: 126 MG/DL — HIGH (ref 70–99)
HCT VFR BLD CALC: 34.7 % — SIGNIFICANT CHANGE UP (ref 34.5–45)
HGB BLD-MCNC: 11.6 G/DL — SIGNIFICANT CHANGE UP (ref 11.5–15.5)
MCHC RBC-ENTMCNC: 28.9 PG — SIGNIFICANT CHANGE UP (ref 27–34)
MCHC RBC-ENTMCNC: 33.4 GM/DL — SIGNIFICANT CHANGE UP (ref 32–36)
MCV RBC AUTO: 86.5 FL — SIGNIFICANT CHANGE UP (ref 80–100)
NRBC # BLD: 0 /100 WBCS — SIGNIFICANT CHANGE UP (ref 0–0)
PLATELET # BLD AUTO: 275 K/UL — SIGNIFICANT CHANGE UP (ref 150–400)
POTASSIUM SERPL-MCNC: 3.9 MMOL/L — SIGNIFICANT CHANGE UP (ref 3.5–5.3)
POTASSIUM SERPL-SCNC: 3.9 MMOL/L — SIGNIFICANT CHANGE UP (ref 3.5–5.3)
RBC # BLD: 4.01 M/UL — SIGNIFICANT CHANGE UP (ref 3.8–5.2)
RBC # FLD: 13 % — SIGNIFICANT CHANGE UP (ref 10.3–14.5)
SODIUM SERPL-SCNC: 140 MMOL/L — SIGNIFICANT CHANGE UP (ref 135–145)
WBC # BLD: 7.49 K/UL — SIGNIFICANT CHANGE UP (ref 3.8–10.5)
WBC # FLD AUTO: 7.49 K/UL — SIGNIFICANT CHANGE UP (ref 3.8–10.5)

## 2023-10-12 PROCEDURE — 88341 IMHCHEM/IMCYTCHM EA ADD ANTB: CPT

## 2023-10-12 PROCEDURE — 88333 PATH CONSLTJ SURG CYTO XM 1: CPT

## 2023-10-12 PROCEDURE — 86901 BLOOD TYPING SEROLOGIC RH(D): CPT

## 2023-10-12 PROCEDURE — 38792 RA TRACER ID OF SENTINL NODE: CPT

## 2023-10-12 PROCEDURE — 86900 BLOOD TYPING SEROLOGIC ABO: CPT

## 2023-10-12 PROCEDURE — 88342 IMHCHEM/IMCYTCHM 1ST ANTB: CPT

## 2023-10-12 PROCEDURE — 86850 RBC ANTIBODY SCREEN: CPT

## 2023-10-12 PROCEDURE — 85027 COMPLETE CBC AUTOMATED: CPT

## 2023-10-12 PROCEDURE — 88309 TISSUE EXAM BY PATHOLOGIST: CPT

## 2023-10-12 PROCEDURE — 80048 BASIC METABOLIC PNL TOTAL CA: CPT

## 2023-10-12 PROCEDURE — 88307 TISSUE EXAM BY PATHOLOGIST: CPT

## 2023-10-12 PROCEDURE — 36415 COLL VENOUS BLD VENIPUNCTURE: CPT

## 2023-10-12 RX ORDER — OXYCODONE HYDROCHLORIDE 5 MG/1
1 TABLET ORAL
Qty: 4 | Refills: 0
Start: 2023-10-12

## 2023-10-12 RX ADMIN — Medication 1000 MILLIGRAM(S): at 07:49

## 2023-10-12 RX ADMIN — ENOXAPARIN SODIUM 40 MILLIGRAM(S): 100 INJECTION SUBCUTANEOUS at 05:01

## 2023-10-12 RX ADMIN — Medication 400 MILLIGRAM(S): at 07:33

## 2023-10-12 NOTE — PROGRESS NOTE ADULT - SUBJECTIVE AND OBJECTIVE BOX
INTERVAL HPI/OVERNIGHT EVENTS:  Pt resting comfortably. No acute complaints overnight. Pt with daughter at bedside. Tolerating regular diet. Admits to pain at incision site. Admits to dizziness while ambulating. Denies fever, chills, nausea, vomiting Denies syncope, palpations, chest pain.     MEDICATIONS  (STANDING):  enoxaparin Injectable 40 milliGRAM(s) SubCutaneous every 24 hours    MEDICATIONS  (PRN):  HYDROmorphone  Injectable 0.5 milliGRAM(s) IV Push every 3 hours PRN Severe Pain (7 - 10)  ketorolac   Injectable 15 milliGRAM(s) IV Push every 6 hours PRN Moderate Pain (4 - 6)      Vital Signs Last 24 Hrs  T(C): 36.9 (12 Oct 2023 05:30), Max: 37 (11 Oct 2023 20:08)  T(F): 98.4 (12 Oct 2023 05:30), Max: 98.6 (11 Oct 2023 20:08)  HR: 72 (12 Oct 2023 05:30) (72 - 108)  BP: 98/56 (12 Oct 2023 05:30) (98/56 - 121/71)  BP(mean): 80 (11 Oct 2023 20:08) (80 - 88)  RR: 18 (12 Oct 2023 05:30) (16 - 28)  SpO2: 95% (12 Oct 2023 05:30) (91% - 100%)    Parameters below as of 12 Oct 2023 05:30  Patient On (Oxygen Delivery Method): room air        Physical:  General: A&Ox3. NAD. Appears comfortable.  Resp: Unlabored breathing. Equal chest bilaterally.  R chest: Dressing clean, dry, intact. Soft around incision site. No fluctuance/hematoma palpated. TUCKER drains in place. Minimal SS output, sanguinous > serous.   Skin: Warm and dry, good color.     I&O's Detail    11 Oct 2023 07:01  -  12 Oct 2023 07:00  --------------------------------------------------------  IN:  Total IN: 0 mL    OUT:    Bulb (mL): 40 mL    Bulb (mL): 50 mL  Total OUT: 90 mL    Total NET: -90 mL          LABS:                        11.6   7.49  )-----------( 275      ( 12 Oct 2023 05:31 )             34.7             10-12    140  |  109<H>  |  13  ----------------------------<  126<H>  3.9   |  23  |  0.70    Ca    9.0      12 Oct 2023 05:31

## 2023-10-12 NOTE — DISCHARGE NOTE PROVIDER - YES NO FOR MLM POSITIVE OR NEGATIVE COVID RESULT
Patient's behavior appropriate to age. Oral mucosa moist. Patient alert and looking at mother. No difficulty breathing noted. Patient meets discharge criteria. Discussed AVS with parent. Questions answered. Parent verbalized understanding. Patient reports being ready to go home. Patient discharged home with family by car with all necessary information.     ,

## 2023-10-12 NOTE — DISCHARGE NOTE NURSING/CASE MANAGEMENT/SOCIAL WORK - NSDCPEFALRISK_GEN_ALL_CORE
For information on Fall & Injury Prevention, visit: [email or web address] OR  [email or web address] OR  https://www.cdc.gov/steadi/patient.html

## 2023-10-12 NOTE — DISCHARGE NOTE PROVIDER - HOSPITAL COURSE
HPI:  59 yo female with history of HLD, Prediabetes, reports the above.  Patient states this the first time her mammogram show cancer in the right breast. She presented for scheduled elective Right Breast Modified Radical Mastectomy with [place] Lymph Node Biopsy, on 10/11/23. Surgery was uneventful and was she cleared for discharge on POD#1 with TUCKER drains x2 when her vitals and labs were stable, she was tolerating a regular diet, drain output with appropriate, and pain was well controlled.

## 2023-10-12 NOTE — DISCHARGE NOTE PROVIDER - CARE PROVIDER_API CALL
Rehan Allen  Surgery  [address]  [place] [address]  Phone: [phone]  Fax: [phone]  Follow Up Time: 2 weeks

## 2023-10-12 NOTE — DISCHARGE NOTE NURSING/CASE MANAGEMENT/SOCIAL WORK - PATIENT PORTAL LINK FT
You can access the FollowMyHealth Patient Portal offered by [facility] by registering at the following website: [email or web address]. By joining [employer] [facility]’s FollowMyHealth portal, you will also be able to view your health information using other applications (apps) compatible with our system.

## 2023-10-12 NOTE — DISCHARGE NOTE PROVIDER - NSDCCPCAREPLAN_GEN_ALL_CORE_FT
PRINCIPAL DISCHARGE DIAGNOSIS  Diagnosis: Malignant neoplasm of unspecified site of right female breast  Assessment and Plan of Treatment: Please follow-up with your surgeon in 1 week. Drink plenty of fluids and rest as needed. Call for any fever over 101, nausea, vomiting, severe pain, no passing of gas or bowel movement.   DIET  You may resume your regular diet as normal.   SURGICAL SITES  Keep dressing in place, it will be taken down at your first follow up appointment. You are being discharged with two TUCKER drains, please empty them as directed and right  ACTIVITY  Do not lift anything heavier than 10 pounds for 2 weeks and avoid strenuous activity for 4-6 weeks.   PAIN CONTROL  You may take Motrin 600mg (with food) or Tylenol 650mg as needed for mild pain. Stagger one medication 3 hours after the other for maximum pain control. Maximum daily dose of Tylenol should not exceed 4grams/day.   You may take your prescribed oxycodone for severe breakthrough pain not that is not relieved by Tylenol/Motrin. Do not drive or make important decisions while taking this medication and do not take more than 4 pills in 24 hours. ecord output.

## 2023-10-12 NOTE — PROGRESS NOTE ADULT - ASSESSMENT
57 y/o s/p R radical mastectomy w sentinel LN bx, POD#0  VSS, BP 96/58, H&H stable     Plan   - monitor TUCKER output  - trend H&H  - pain control prn   - encourage ambulation / OOB  - DVT ppx  -  IS  - d/c planning

## 2023-10-12 NOTE — DISCHARGE NOTE PROVIDER - NSDCMRMEDTOKEN_GEN_ALL_CORE_FT
atorvastatin 10 mg oral tablet: 1 tab(s) orally once a day (at bedtime)  Multiple Vitamins oral tablet: 1 tab(s) orally once a day  Omega-3 1000 mg oral capsule: 1 cap(s) orally once a day  oxyCODONE 5 mg oral tablet: 1 tab(s) orally every 6 hours as needed for  severe pain MDD: 4

## 2023-10-13 PROBLEM — E78.5 HYPERLIPIDEMIA, UNSPECIFIED: Chronic | Status: ACTIVE | Noted: 2023-10-03

## 2023-10-16 ENCOUNTER — APPOINTMENT (OUTPATIENT)
Age: 58
End: 2023-10-16
Payer: MEDICAID

## 2023-10-16 PROCEDURE — 99024 POSTOP FOLLOW-UP VISIT: CPT

## 2023-10-18 LAB
SURGICAL PATHOLOGY STUDY: SIGNIFICANT CHANGE UP
SURGICAL PATHOLOGY STUDY: SIGNIFICANT CHANGE UP

## 2023-10-19 ENCOUNTER — NON-APPOINTMENT (OUTPATIENT)
Age: 58
End: 2023-10-19

## 2023-11-06 ENCOUNTER — APPOINTMENT (OUTPATIENT)
Dept: SURGERY | Facility: CLINIC | Age: 58
End: 2023-11-06
Payer: MEDICAID

## 2023-11-06 PROCEDURE — 99024 POSTOP FOLLOW-UP VISIT: CPT

## 2024-02-08 ENCOUNTER — APPOINTMENT (OUTPATIENT)
Dept: SURGERY | Facility: CLINIC | Age: 59
End: 2024-02-08
Payer: MEDICAID

## 2024-02-08 VITALS
WEIGHT: 152 LBS | HEIGHT: 57 IN | OXYGEN SATURATION: 98 % | DIASTOLIC BLOOD PRESSURE: 81 MMHG | HEART RATE: 91 BPM | SYSTOLIC BLOOD PRESSURE: 146 MMHG | BODY MASS INDEX: 32.79 KG/M2

## 2024-02-08 DIAGNOSIS — C50.911 MALIGNANT NEOPLASM OF UNSPECIFIED SITE OF RIGHT FEMALE BREAST: ICD-10-CM

## 2024-02-08 PROCEDURE — 99213 OFFICE O/P EST LOW 20 MIN: CPT

## 2024-02-08 NOTE — PHYSICAL EXAM
[Alert] : alert [Oriented to Person] : oriented to person [Oriented to Place] : oriented to place [Oriented to Time] : oriented to time [Calm] : calm [de-identified] : She  is alert, well-groomed, and in NAD   [de-identified] : anicteric.  Nasal mucosa pink, septum midline. Oral mucosa pink.  Tongue midline, Pharynx without exudates.   [de-identified] : Neck supple. Trachea midline. Thyroid isthmus barely palpable, lobes not felt.   [de-identified] :  right breast mastectomy  healed  well. no palpable recurrence.  Left breast without  palpable nodules, masses, tenderness, or axillary or supraclavicular adenopathy. No nipple discharge. no skin retraction

## 2024-02-08 NOTE — HISTORY OF PRESENT ILLNESS
[de-identified] : Ms. POZO is s/p Right Breast Modified Radical Mastectomy with [place] Lymph Node Biopsy, on 10/11/23.  Patient's pathology results were  consistent with Invasive ductal carcinoma (17 mm, pT1c), well-differentiated, Colden grade 1/3, score 59, contiguous with previous biopsy site.   Ductal carcinoma in situ, low nuclear grade, cribriform type,  peritumoral and remote from tumor mass .  Margins of resection, nipple and overlying skin are uninvolved.  Right axillary sentinel lymph nodes   (2) without isolated epithelial cells with cytokeratin antibodies.  she was advised to consult oncologist DR Nassar. She is on Anastrozole. Patient reports no interval changes to her overall health status or medical history .    Ms. POZO denies any trauma to the breast, denies  skin changes  and   she has no spontaneous nipple discharge. Patient denies any fever, night sweats or loss of appetite.

## 2024-02-08 NOTE — ASSESSMENT
[FreeTextEntry1] : Impression: right breast CA - s/p nmastectomy Ms. POZO is doing well, with excellent post-operative recovery. The surgical incision  healed    Pathology results were discussed in details.     Patient's questions and concerns addressed to patient's satisfaction.

## 2024-02-08 NOTE — PLAN
[FreeTextEntry1] : Ms. POZO  is presenting  today for an evaluation .  she is doing well and offers no complaints.  Results of  her last    physical examination findings were discussed in details.  Significance of the findings were discussed.    She  was advised to have  left    breast US and Mammogram  and return  in 6 months for exam. Importance of monthly self-breast examination was reinforced.  Patient's questions and concerns addressed to patient's satisfaction.     continue hormonal therapy as per oncology

## 2024-07-08 NOTE — H&P PST ADULT - PATIENT'S PREFERRED PRONOUN
"July 8, 2024      [facility]  [address] [place] [place] [address]            Wound care supplies were not ordered or needed today.          Wound Care Instructions    2 TIMES PER WEEK and as needed, Cleanse your right shin wound(s) with Dilute hibiclens 30cc in 500cc NS.    Pat Dry with non-sterile gauze    Apply Lotion to the intact skin surrounding your wound and other dry skin locations. Some good lotions include: Remedy Skin Repair Cream, Sarna, Vanicream or Cetaphil    Primary Dressing: Apply medihoney into/onto the wounds    Secondary dressing: Cover with bordered foam    Secure with non-sterile roll gauze (4\" x 75\" roll) and tape (1\" roll tape) as needed; avoid adhesive directly on the skin    Compression: 2 layer lite to the right and velcro wrap or compression stocking to the left leg    Elevate the legs 40 minutes 4 times per day above the level of the heart    It is not ok to get your wound wet in the bath or shower      If for some reason you are not able to get your dressing(s) changed as outlined above (due to illness, lack of supplies, lack of help) please do the following: remove old, soiled dressings; wash the wounds with saline; pat dry; apply ABD pad or other absorbant pad and secure with rolled gauze; avoid tape directly on your skin; Call the clinic as soon as possible to let us know what the current issues are in receiving wound care [phone].      SEEK MEDICAL CARE IF:  You have an increase in swelling, pain, or redness around the wound.  You have an increase in the amount of pus coming from the wound.  There is a bad smell coming from the wound.  The wound appears to be worsening/enlarging  You have a fever greater than 101.5 F      It is ok to continue current wound care treatment/products for the next 2-3 days until new wound care supplies are ordered and arrive. If longer than this please contact our office at [phone].    If you have a 2 layer or 4 layer compression " wrap on these are safe to have on for ONLY 7 days. If for some reason you are not able to get the wrap(s) changed (due to illness; lack of supplies, lack of help, lack of transportation) please do the following: unwrap the old 2 or 4 layer compression wrap; avoid using scissors as you could cut your skin and cause wounds; use tubular compression when available. Call to reschedule your home care or clinic visit appointment as soon as possible.    Please NOTE: if you are 15 minutes late to your clinic appointment you will have to be rescheduled. Please call our clinic as soon as possible if you know you will not be able to get to your appointment at [phone].    If you fail to show up to 3 scheduled clinic appointments you will be dismissed from our clinic.              We want to hear from you!  In the next few weeks, you should receive a call or email to complete a survey about your visit at [facility]. Please help us improve your appointment experience by letting us know how we did today. We strive to make your experience good and value any ways in which we could do better.      We value your input and suggestions.    Thank you for choosing the [facility]!           It is recommended that you do not get your ulcer wet when showering.  Listed below are several ways of keeping it dry when you shower.     1. Wrap it with Press and Seal plastic wrap.  It can be found in the stores where the plastic wraps or tin foil is kept.               2.  Some people take a bath and hang their leg/foot out of the tub.                        3  Put your leg in a plastic bag and tape it on.           4. You can purchase a shower cover for casts at some pharmacies and through the Internet.            5. Take a Bed Bath or wash up at the sink     High Protein Foods  Chicken  -Chicken breast, 3.5oz.-30 grams protein  -Chicken thigh-10 grams(average size)  -Drumstick-11 grams  -Wing- 6  grams  -Chicken meat, cooked, 4 oz.  Beef  -Hamburger herminia, 4 oz-28 grams protein  -Steak, 6 oz-42 grams  -Most cuts of beef- 7 grams of protein per ounce  Fish  -Most fish fillets or steaks are about 22 grams of protein for 3 1/2 oz(100 grams) of cooked fish, or 6 grams per ounce  -Tuna, 6 oz can-40 grams of protein  Pork  -Pork chop, average-22 grams protein  -Pork loin or tenderloin, 4 oz.-29 grams  -Ham, 3oz serving- 19 grams  -Ground pork 3oz cooked-22 grams  -Pillai, 1 slice-3 grams  -Lamont-style pillai(black pillai), slice-5-6 grams  Eggs and Dairy  -Egg, large-7 grams  -Milk, 1 cup-8 grams  -Cottage cheese, 1/2 cup-15 grams  -Greek yogurt, 1 cup-usually 8-12 grams, check label  -Soft cheeses (Mozzarella, Brie, Camembert)- 6 grams  -Medium cheeses(cheddar, swiss)- 7 or 8 grams per oz  -Hard cheeses(parmesan)- 10 grams per oz  Beans  -Tofu, 1/2 cup 20 grams  -Tofu, 1 oz., 2.3 grams  -Soy milk, 1 cup-6-10 grams  -Most beans(black, vargas, lentils, etc.) about 7-10 grams protein per half cup of cooked beans  -soy beans, 1/2 cup cooked-14 grams  -Split peas, 1/2 cup cooked- 8 grams  Nuts and Seeds  -Peanut butter, 2 Tablespoons- 8 grams protein  -Almonds, 1/4 cup- 8 grams  -Peanuts, 1/4 cup-9 grams  -Cashews, 1/4 cup- 5 grams  -Pecans, 1/4 cup- 2.5 grams  -Sunflower seeds, 1/4 cup- 6 grams  -Pumpkin seeds, 1/4 cup-8 grams  -Flax seeds- 1/4 cup- 8 grams  Protein Supplements  -Ensure  -Boost  -Glucerna, if diabetic  When you have an open ulcer, your bodies protein needs are much higher, so it is recommended eat good sources of protein    Her/She

## 2024-07-31 ENCOUNTER — NON-APPOINTMENT (OUTPATIENT)
Age: 59
End: 2024-07-31

## 2024-07-31 NOTE — HISTORY OF PRESENT ILLNESS
[de-identified] : This is  a 59 year   old patient presenting today for a breast exam and to discuss the results of her recent  breast imaging. Patient had a Left breast US and   Left  Mammogram on 2024.  Deemed BIRADS category 2.     PERTINENT HISTORY:  There is family history of breast carcinoma in paternal cousin. Menarche at age 14 , -3 , para-3 , and her last menstrual period was at age 41.. Patient is on no hormonal replacement therapy.  Patient had a B mammogram on 2023 , that was deemed a BIRADS 4.  Patient had a targeted Right breast US and a mammogram on 2023 that was deemed a BIRADS 5. A sono-guided biopsy of the right breast was done on 2023. Pathology results were consistent with Invasive moderately differentiated ductal carcinoma, Faith score 6/9. Invasive tumor measures at least 1.2 cm. Microcalcifications are present. Lymphovascular permeation by tumor is not seen. The pathology results are concordant with the imaging. ER/GA positive. HER2 negative. Patient had a BL breast MRI on 08/10/2023. Deemed BIRADS category 4 and showed Known right 4:00 axis breast cancer measuring up to 1.5-cm in greatest/anterior-posterior dimension and suspicious. non-mass enhancement in the upper inner region 5-cmfn  Ms. POZO is s/p Right Breast Modified Radical Mastectomy with [place] Lymph Node Biopsy, on 10/11/23. Patient's pathology results were consistent with Invasive ductal carcinoma (17 mm, pT1c), well-differentiated, Faith grade 1/3, score 59, contiguous with previous biopsy site. Ductal carcinoma in situ, low nuclear grade, cribriform type, peritumoral and remote from tumor mass. Margins of resection, nipple and overlying skin are uninvolved. Right axillary sentinel lymph nodes (2) without isolated epithelial cells with cytokeratin antibodies. she was advised to consult oncologist DR Nassar. She is on Anastrozole.

## 2024-07-31 NOTE — DATA REVIEWED
[FreeTextEntry1] : 	 Exam requested by: RAMIRO WALLACE MD [id number] [place] [address], [address] [place] [address] [facility] PHONE: [phone] Patient: GISELLE MCMAHAN YOB: 1965 Phone: [phone] MRN: [id number] Acc: [id number] Date of Exam: 04-   EXAM:  DIGITAL UNILATERAL LEFT DIAGNOSTIC MAMMOGRAM WITH CAD AND TOMOSYNTHESIS AND BREAST ULTRASOUND  HISTORY:  The patient is seen for an annual examination.  Personal history of right breast cancer status post mastectomy in October 2023. Age: 58 years old.  CLINICAL BREAST EXAMINATION:  The patient does not state when she had her last clinical breast exam.   COMPARISON:  The present examination has been compared to prior breast imaging studies dating back to 6/11/2023.  MAMMOGRAM:  TECHNIQUE:  Low-dose full-field digital breast tomosynthesis examination was performed with tomosynthesis acquisitions and synthesized 2D reconstructed mammogram obtained. The following views were obtained digitally: left craniocaudal, left mediolateral oblique. Computer-aided detection was applied.  FINDINGS: BREAST COMPOSITION:  The breasts are heterogeneously dense, which may obscure small masses.   No suspicious masses, architectural distortion, or significant calcifications are detected.  BREAST ULTRASOUND:    TECHNIQUE:  Complete left breast ultrasound, with evaluation of the four quadrants, retroareolar region, and axilla, was performed.   FINDINGS:  Benign clustered microcysts 4 o'clock 2 cm the nipple, 0.4 x 0.3 x 0.3 cm.  There are no suspicious solid or complex cystic masses identified. There is no lymphadenopathy.   IMPRESSION:  No mammographic or ultrasonographic evidence of malignancy.    FOLLOW-UP:  Follow-up imaging in 12 months.     ASSESSMENT:  BI-RADS Category 2:  Benign.   This examination should not preclude the clinical evaluation of a suspicious palpable abnormality.  As per the FDA requirements, a layman's letter has been generated and sent to your patient stating the results and recommendations of this breast imaging study. We have entered your patient into our reminder system and will notify them when they are due for their next breast imaging exam.   Thank you for the opportunity to participate in the care of this patient.     Romana Diop MD  - Electronically Signed: 04- 3:54 PM  Physician to Physician Direct Line is: [phone]

## 2024-08-08 ENCOUNTER — APPOINTMENT (OUTPATIENT)
Dept: SURGERY | Facility: CLINIC | Age: 59
End: 2024-08-08

## 2024-08-08 PROCEDURE — 99213 OFFICE O/P EST LOW 20 MIN: CPT

## 2024-08-08 NOTE — ASSESSMENT
[FreeTextEntry1] : Impression: right breast CA - s/p mastectomy, no palpable recurrence.   Ms. POZO is doing well, with excellent post-operative recovery. The surgical incision  healed . Benign left breast exam     Patient's questions and concerns addressed to patient's satisfaction.

## 2024-08-08 NOTE — PHYSICAL EXAM
[Alert] : alert [Oriented to Person] : oriented to person [Oriented to Place] : oriented to place [Oriented to Time] : oriented to time [Calm] : calm [de-identified] : She  is alert, well-groomed, and in NAD   [de-identified] : anicteric.  Nasal mucosa pink, septum midline. Oral mucosa pink.  Tongue midline, Pharynx without exudates.   [de-identified] : Neck supple. Trachea midline. Thyroid isthmus barely palpable, lobes not felt.   [de-identified] :  right breast mastectomy  healed  well. no palpable recurrence.  Left breast without  palpable nodules, masses, tenderness, or axillary or supraclavicular adenopathy. No nipple discharge. no skin retraction

## 2024-08-08 NOTE — PHYSICAL EXAM
[Alert] : alert [Oriented to Person] : oriented to person [Oriented to Place] : oriented to place [Oriented to Time] : oriented to time [Calm] : calm [de-identified] : She  is alert, well-groomed, and in NAD   [de-identified] : anicteric.  Nasal mucosa pink, septum midline. Oral mucosa pink.  Tongue midline, Pharynx without exudates.   [de-identified] : Neck supple. Trachea midline. Thyroid isthmus barely palpable, lobes not felt.   [de-identified] :  right breast mastectomy  healed  well. no palpable recurrence.  Left breast without  palpable nodules, masses, tenderness, or axillary or supraclavicular adenopathy. No nipple discharge. no skin retraction

## 2024-08-08 NOTE — PLAN
[FreeTextEntry1] : Ms. POZO  is presenting  today for an evaluation .  she is doing well and offers no complaints.  Results of  her last   imaging and physical examination findings were discussed in details.  Significance of the findings were discussed.    She  was advised to   return in 6 months for a breast exam . Importance of monthly self-breast examination was reinforced.  Patient's questions and concerns addressed to patient's satisfaction.   Continue anastrozole as per oncology  NSAIDS for right axilla pain PRN.

## 2024-08-08 NOTE — HISTORY OF PRESENT ILLNESS
[de-identified] : This is  a 59 year   old patient presenting today for a breast exam and to discuss the results of her recent  breast imaging. Patient had a Left breast US and   Left  Mammogram on 2024.  Deemed BIRADS category 2.   Patient reports no interval changes to her overall health status or medical history. she is reporting occasional right axilla pain.    Ms. POZO denies any trauma to the breast, denies  skin changes  and   she has no spontaneous nipple discharge. Patient denies any fever, night sweats or loss of appetite.       PERTINENT HISTORY:  There is family history of breast carcinoma in paternal cousin. Menarche at age 14 , -3 , para-3 , and her last menstrual period was at age 41.. Patient is on no hormonal replacement therapy.  Patient had a B mammogram on 2023 , that was deemed a BIRADS 4.  Patient had a targeted Right breast US and a mammogram on 2023 that was deemed a BIRADS 5. A sono-guided biopsy of the right breast was done on 2023. Pathology results were consistent with Invasive moderately differentiated ductal carcinoma, [place] score 6/9. Invasive tumor measures at least 1.2 cm. Microcalcifications are present. Lymphovascular permeation by tumor is not seen. The pathology results are concordant with the imaging. ER/DC positive. HER2 negative. Patient had a BL breast MRI on 08/10/2023. Deemed BIRADS category 4 and showed Known right 4:00 axis breast cancer measuring up to 1.5-cm in greatest/anterior-posterior dimension and suspicious. non-mass enhancement in the upper inner region 5-cmfn  Ms. POZO is s/p Right Breast Modified Radical Mastectomy with [place] Lymph Node Biopsy, on 10/11/23. Patient's pathology results were consistent with Invasive ductal carcinoma (17 mm, pT1c), well-differentiated, Faith grade 1/3, score 59, contiguous with previous biopsy site. Ductal carcinoma in situ, low nuclear grade, cribriform type, peritumoral and remote from tumor mass. Margins of resection, nipple and overlying skin are uninvolved. Right axillary sentinel lymph nodes (2) without isolated epithelial cells with cytokeratin antibodies. she was advised to consult oncologist DR Nassar. She is on Anastrozole.

## 2025-02-10 ENCOUNTER — APPOINTMENT (OUTPATIENT)
Dept: SURGERY | Facility: CLINIC | Age: 60
End: 2025-02-10
Payer: MEDICAID

## 2025-02-10 VITALS
OXYGEN SATURATION: 99 % | BODY MASS INDEX: 32.36 KG/M2 | WEIGHT: 150 LBS | DIASTOLIC BLOOD PRESSURE: 88 MMHG | HEART RATE: 88 BPM | SYSTOLIC BLOOD PRESSURE: 133 MMHG | HEIGHT: 57 IN

## 2025-02-10 DIAGNOSIS — C50.911 MALIGNANT NEOPLASM OF UNSPECIFIED SITE OF RIGHT FEMALE BREAST: ICD-10-CM

## 2025-02-10 PROCEDURE — 99213 OFFICE O/P EST LOW 20 MIN: CPT

## 2025-02-21 ENCOUNTER — NON-APPOINTMENT (OUTPATIENT)
Age: 60
End: 2025-02-21

## 2025-02-21 ENCOUNTER — APPOINTMENT (OUTPATIENT)
Facility: CLINIC | Age: 60
End: 2025-02-21

## 2025-02-21 VITALS
DIASTOLIC BLOOD PRESSURE: 92 MMHG | HEART RATE: 106 BPM | HEIGHT: 57 IN | BODY MASS INDEX: 32.36 KG/M2 | OXYGEN SATURATION: 100 % | WEIGHT: 150 LBS | SYSTOLIC BLOOD PRESSURE: 125 MMHG

## 2025-02-21 DIAGNOSIS — M79.646 PAIN IN UNSPECIFIED FINGER(S): ICD-10-CM

## 2025-02-21 DIAGNOSIS — M79.644 PAIN IN RIGHT FINGER(S): ICD-10-CM

## 2025-02-21 PROCEDURE — 99203 OFFICE O/P NEW LOW 30 MIN: CPT

## 2025-02-21 RX ORDER — CALCIUM CARBONATE/VITAMIN D3 600 MG-10
TABLET ORAL
Refills: 0 | Status: ACTIVE | COMMUNITY

## 2025-02-21 RX ORDER — ANASTROZOLE TABLETS 1 MG/1
1 TABLET ORAL
Refills: 0 | Status: ACTIVE | COMMUNITY

## 2025-03-14 ENCOUNTER — APPOINTMENT (OUTPATIENT)
Facility: CLINIC | Age: 60
End: 2025-03-14

## 2025-03-14 VITALS
BODY MASS INDEX: 32.36 KG/M2 | DIASTOLIC BLOOD PRESSURE: 89 MMHG | HEIGHT: 57 IN | SYSTOLIC BLOOD PRESSURE: 128 MMHG | OXYGEN SATURATION: 99 % | WEIGHT: 150 LBS | HEART RATE: 110 BPM

## 2025-03-14 PROCEDURE — 99213 OFFICE O/P EST LOW 20 MIN: CPT | Mod: 25

## 2025-03-14 PROCEDURE — T1013: CPT

## 2025-03-14 PROCEDURE — 20550 NJX 1 TENDON SHEATH/LIGAMENT: CPT | Mod: RT

## 2025-05-09 ENCOUNTER — APPOINTMENT (OUTPATIENT)
Facility: CLINIC | Age: 60
End: 2025-05-09

## 2025-05-15 ENCOUNTER — APPOINTMENT (OUTPATIENT)
Dept: SURGERY | Facility: CLINIC | Age: 60
End: 2025-05-15
Payer: MEDICAID

## 2025-05-15 VITALS
OXYGEN SATURATION: 97 % | HEIGHT: 57 IN | DIASTOLIC BLOOD PRESSURE: 83 MMHG | SYSTOLIC BLOOD PRESSURE: 134 MMHG | HEART RATE: 89 BPM | BODY MASS INDEX: 33.22 KG/M2 | WEIGHT: 154 LBS

## 2025-05-15 DIAGNOSIS — C50.911 MALIGNANT NEOPLASM OF UNSPECIFIED SITE OF RIGHT FEMALE BREAST: ICD-10-CM

## 2025-05-15 DIAGNOSIS — M79.646 PAIN IN UNSPECIFIED FINGER(S): ICD-10-CM

## 2025-05-15 DIAGNOSIS — M79.644 PAIN IN RIGHT FINGER(S): ICD-10-CM

## 2025-05-15 PROCEDURE — 99213 OFFICE O/P EST LOW 20 MIN: CPT

## (undated) DEVICE — DRSG TRACH DRAINAGE 4X4

## (undated) DEVICE — ELCTR GROUNDING PAD ADULT COVIDIEN

## (undated) DEVICE — VENODYNE/SCD SLEEVE CALF MEDIUM

## (undated) DEVICE — FOR-ESU VALLEYLAB T7E15009DX: Type: DURABLE MEDICAL EQUIPMENT

## (undated) DEVICE — PACK MAJOR ABDOMINAL WITH LAP

## (undated) DEVICE — ELCTR STRYKER NEPTUNE SMOKE EVACUATION PENCIL (GREEN)

## (undated) DEVICE — WARMING BLANKET FULL ADULT

## (undated) DEVICE — DRSG MASTISOL

## (undated) DEVICE — DRSG STERISTRIPS 0.5 X 4"

## (undated) DEVICE — DRAPE LAPAROTOMY TRANSVERSE

## (undated) DEVICE — DRAIN JACKSON PRATT 10MM FLAT FULL NO TROCAR

## (undated) DEVICE — WARMING BLANKET LOWER ADULT

## (undated) DEVICE — DRAPE LIGHT HANDLE COVER (BLUE)

## (undated) DEVICE — DRAIN RESERVOIR FOR JACKSON PRATT 100CC CARDINAL

## (undated) DEVICE — DRAPE 1/2 SHEET 40X57"

## (undated) DEVICE — SUT SOFSILK 2-0 18" C-15

## (undated) DEVICE — SOL IRR POUR H2O 1500ML

## (undated) DEVICE — DRSG CURITY GAUZE SPONGE 4 X 4" 12-PLY

## (undated) DEVICE — GLV 7 PROTEXIS (WHITE)

## (undated) DEVICE — GLV 7.5 PROTEXIS (WHITE)